# Patient Record
Sex: MALE | Race: WHITE | NOT HISPANIC OR LATINO | Employment: FULL TIME | URBAN - METROPOLITAN AREA
[De-identification: names, ages, dates, MRNs, and addresses within clinical notes are randomized per-mention and may not be internally consistent; named-entity substitution may affect disease eponyms.]

---

## 2023-05-19 PROBLEM — K21.9 CHRONIC GERD: Status: ACTIVE | Noted: 2023-05-19

## 2023-05-19 PROBLEM — Q43.3 MALROTATION OF INTESTINE (MULTI): Status: ACTIVE | Noted: 2023-05-19

## 2023-05-19 PROBLEM — H91.93 HEARING LOSS, BILATERAL: Status: ACTIVE | Noted: 2023-05-19

## 2023-05-19 PROBLEM — I86.1 VARICOCELE: Status: ACTIVE | Noted: 2023-05-19

## 2023-05-19 PROBLEM — Z86.79 PERSONAL HISTORY OF ATRIAL FIBRILLATION: Status: ACTIVE | Noted: 2023-05-19

## 2023-05-19 PROBLEM — I83.90 VARICOSITIES OF LEG: Status: ACTIVE | Noted: 2023-05-19

## 2023-05-19 PROBLEM — R17 ELEVATED BILIRUBIN: Status: ACTIVE | Noted: 2023-05-19

## 2023-05-29 ASSESSMENT — PROMIS GLOBAL HEALTH SCALE
RATE_PHYSICAL_HEALTH: GOOD
RATE_AVERAGE_FATIGUE: MODERATE
CARRYOUT_PHYSICAL_ACTIVITIES: COMPLETELY
RATE_QUALITY_OF_LIFE: VERY GOOD
RATE_MENTAL_HEALTH: GOOD
EMOTIONAL_PROBLEMS: RARELY
RATE_SOCIAL_SATISFACTION: GOOD
RATE_AVERAGE_PAIN: 3
CARRYOUT_SOCIAL_ACTIVITIES: GOOD
RATE_GENERAL_HEALTH: GOOD

## 2023-05-30 ENCOUNTER — OFFICE VISIT (OUTPATIENT)
Dept: PRIMARY CARE | Facility: CLINIC | Age: 58
End: 2023-05-30
Payer: COMMERCIAL

## 2023-05-30 VITALS
SYSTOLIC BLOOD PRESSURE: 112 MMHG | HEART RATE: 62 BPM | RESPIRATION RATE: 19 BRPM | HEIGHT: 71 IN | BODY MASS INDEX: 28.14 KG/M2 | DIASTOLIC BLOOD PRESSURE: 60 MMHG | WEIGHT: 201 LBS

## 2023-05-30 DIAGNOSIS — Z00.00 ROUTINE GENERAL MEDICAL EXAMINATION AT A HEALTH CARE FACILITY: Primary | ICD-10-CM

## 2023-05-30 DIAGNOSIS — Z13.29 SCREENING FOR THYROID DISORDER: ICD-10-CM

## 2023-05-30 DIAGNOSIS — Z12.5 SCREENING FOR PROSTATE CANCER: ICD-10-CM

## 2023-05-30 DIAGNOSIS — R17 ELEVATED BILIRUBIN: ICD-10-CM

## 2023-05-30 DIAGNOSIS — K21.9 CHRONIC GERD: ICD-10-CM

## 2023-05-30 DIAGNOSIS — Z86.79 PERSONAL HISTORY OF ATRIAL FIBRILLATION: ICD-10-CM

## 2023-05-30 DIAGNOSIS — Z13.220 SCREENING FOR LIPID DISORDERS: ICD-10-CM

## 2023-05-30 DIAGNOSIS — H90.3 SENSORINEURAL HEARING LOSS (SNHL) OF BOTH EARS: ICD-10-CM

## 2023-05-30 DIAGNOSIS — I83.93 VARICOSE VEINS OF BOTH LOWER EXTREMITIES WITHOUT ULCER OR INFLAMMATION: ICD-10-CM

## 2023-05-30 PROBLEM — I83.90 VARICOSE VEINS WITHOUT COMPLICATION: Status: RESOLVED | Noted: 2023-05-19 | Resolved: 2023-05-30

## 2023-05-30 PROCEDURE — 84153 ASSAY OF PSA TOTAL: CPT

## 2023-05-30 PROCEDURE — 80061 LIPID PANEL: CPT

## 2023-05-30 PROCEDURE — 84443 ASSAY THYROID STIM HORMONE: CPT

## 2023-05-30 PROCEDURE — 85027 COMPLETE CBC AUTOMATED: CPT

## 2023-05-30 PROCEDURE — 80053 COMPREHEN METABOLIC PANEL: CPT

## 2023-05-30 PROCEDURE — 1036F TOBACCO NON-USER: CPT | Performed by: INTERNAL MEDICINE

## 2023-05-30 PROCEDURE — 99396 PREV VISIT EST AGE 40-64: CPT | Performed by: INTERNAL MEDICINE

## 2023-05-30 ASSESSMENT — ENCOUNTER SYMPTOMS
HEADACHES: 0
DYSPHORIC MOOD: 0
WHEEZING: 0
WOUND: 0
LIGHT-HEADEDNESS: 0
POLYDIPSIA: 0
CHEST TIGHTNESS: 0
HALLUCINATIONS: 0
ADENOPATHY: 0
FACIAL SWELLING: 0
BRUISES/BLEEDS EASILY: 0
MYALGIAS: 0
DIZZINESS: 0
SINUS PRESSURE: 0
SPEECH DIFFICULTY: 0
EYE DISCHARGE: 0
NUMBNESS: 0
EYE REDNESS: 0
APPETITE CHANGE: 0
APNEA: 0
NERVOUS/ANXIOUS: 0
DIAPHORESIS: 0
NECK STIFFNESS: 0
JOINT SWELLING: 0
DYSURIA: 0
COLOR CHANGE: 0
BACK PAIN: 0
BLOOD IN STOOL: 0
FACIAL ASYMMETRY: 0
NECK PAIN: 0
PHOTOPHOBIA: 0
ABDOMINAL DISTENTION: 0
FLANK PAIN: 0
POLYPHAGIA: 0
EYE PAIN: 0
UNEXPECTED WEIGHT CHANGE: 0
FATIGUE: 0
ABDOMINAL PAIN: 0
RHINORRHEA: 0
FREQUENCY: 1
HYPERACTIVE: 0
TROUBLE SWALLOWING: 0
SORE THROAT: 0
SHORTNESS OF BREATH: 0
ARTHRALGIAS: 1
VOICE CHANGE: 0
WEAKNESS: 0
ACTIVITY CHANGE: 0
CHILLS: 0
TREMORS: 0
DIFFICULTY URINATING: 0
AGITATION: 0
CONSTIPATION: 0
VOMITING: 0
SLEEP DISTURBANCE: 0
CONFUSION: 0
ANAL BLEEDING: 0
HEMATURIA: 0
SEIZURES: 0
CHOKING: 0
STRIDOR: 0
DECREASED CONCENTRATION: 0
PALPITATIONS: 0
NAUSEA: 0
FEVER: 0
SINUS PAIN: 0
RECTAL PAIN: 0
COUGH: 0
DIARRHEA: 0
EYE ITCHING: 0

## 2023-05-30 NOTE — PATIENT INSTRUCTIONS
Continue healthy diet and exercise regularly-goal of 150 minutes a week of moderate activity  We did blood work today and will call with results if abnormal or changed from previous year   Look for Flu and yearly covid boosters in the fall  Follow up here in 1 year-sooner if needed

## 2023-05-30 NOTE — PROGRESS NOTES
Subjective   Patient ID: Anibal Hobson is a 57 y.o. male who presents for Annual Exam.    HPI  Pt here for full physical.  He is feeling well.  His diet is good and staying active-2 times a week he exercises.  His weight is stable.      He had 4 Covid vaccines plus had covid.  He normally gets flu shots.  He had the 2 part Shingles vaccine.      He did Cologuard in May of 2022 that was normal.  He has no GI issues.  No family history of colorectal cancer.      Review of Systems   Constitutional:  Negative for activity change, appetite change, chills, diaphoresis, fatigue, fever and unexpected weight change.   HENT:  Positive for hearing loss (has hearing aides). Negative for congestion, dental problem, drooling, ear discharge, ear pain, facial swelling, mouth sores, nosebleeds, postnasal drip, rhinorrhea, sinus pressure, sinus pain, sneezing, sore throat, tinnitus, trouble swallowing and voice change.    Eyes:  Positive for visual disturbance (wears glasses-up to date on exam). Negative for photophobia, pain, discharge, redness and itching.   Respiratory:  Negative for apnea, cough, choking, chest tightness, shortness of breath, wheezing and stridor.    Cardiovascular:  Negative for chest pain, palpitations and leg swelling.   Gastrointestinal:  Negative for abdominal distention, abdominal pain, anal bleeding, blood in stool, constipation, diarrhea, nausea, rectal pain and vomiting.   Endocrine: Negative for cold intolerance, heat intolerance, polydipsia, polyphagia and polyuria.   Genitourinary:  Positive for frequency. Negative for decreased urine volume, difficulty urinating, dysuria, enuresis, flank pain, genital sores, hematuria, penile discharge, penile pain, penile swelling, scrotal swelling, testicular pain and urgency.   Musculoskeletal:  Positive for arthralgias (knee pain). Negative for back pain, gait problem, joint swelling, myalgias, neck pain and neck stiffness.   Skin:  Negative for color change,  "pallor, rash and wound.        Skin lesion on face/back    Allergic/Immunologic: Negative for environmental allergies, food allergies and immunocompromised state.   Neurological:  Negative for dizziness, tremors, seizures, syncope, facial asymmetry, speech difficulty, weakness, light-headedness, numbness and headaches.   Hematological:  Negative for adenopathy. Does not bruise/bleed easily.   Psychiatric/Behavioral:  Negative for agitation, behavioral problems, confusion, decreased concentration, dysphoric mood, hallucinations, self-injury, sleep disturbance and suicidal ideas. The patient is not nervous/anxious and is not hyperactive.        Objective   /60   Pulse 62   Resp 19   Ht 1.803 m (5' 11\")   Wt 91.2 kg (201 lb)   BMI 28.03 kg/m²    Physical Exam  Constitutional:       Appearance: Normal appearance.   HENT:      Head: Normocephalic and atraumatic.      Right Ear: Tympanic membrane, ear canal and external ear normal. There is no impacted cerumen.      Left Ear: Tympanic membrane, ear canal and external ear normal. There is no impacted cerumen.      Nose: Nose normal. No congestion or rhinorrhea.      Mouth/Throat:      Mouth: Mucous membranes are moist.      Pharynx: Oropharynx is clear. No oropharyngeal exudate or posterior oropharyngeal erythema.   Eyes:      Extraocular Movements: Extraocular movements intact.      Conjunctiva/sclera: Conjunctivae normal.      Pupils: Pupils are equal, round, and reactive to light.   Neck:      Vascular: No carotid bruit.   Cardiovascular:      Rate and Rhythm: Normal rate and regular rhythm.      Pulses: Normal pulses.      Heart sounds: Normal heart sounds. No murmur heard.  Pulmonary:      Effort: Pulmonary effort is normal. No respiratory distress.      Breath sounds: Normal breath sounds. No wheezing, rhonchi or rales.   Abdominal:      General: Abdomen is flat. Bowel sounds are normal. There is no distension.      Palpations: Abdomen is soft.      " Tenderness: There is no abdominal tenderness.      Hernia: No hernia is present.   Musculoskeletal:         General: No swelling or tenderness. Normal range of motion.      Cervical back: Normal range of motion and neck supple.      Right lower leg: No edema.      Left lower leg: No edema.   Lymphadenopathy:      Cervical: No cervical adenopathy.   Skin:     General: Skin is warm and dry.      Findings: No lesion or rash.   Neurological:      General: No focal deficit present.      Mental Status: He is alert and oriented to person, place, and time.      Cranial Nerves: No cranial nerve deficit.      Sensory: No sensory deficit.      Motor: No weakness.   Psychiatric:         Mood and Affect: Mood normal.         Behavior: Behavior normal.         Thought Content: Thought content normal.         Judgment: Judgment normal.         Assessment/Plan   Problem List Items Addressed This Visit          Circulatory    Varicose veins of both lower extremities without ulcer or inflammation     Pt wears compression socks when able  No current issues             Digestive    Chronic GERD     Not on any medications  Diet controlled             Other    Elevated bilirubin    Hearing loss, bilateral     Wears hearing aides          Personal history of atrial fibrillation     No recent issues           Other Visit Diagnoses       Routine general medical examination at a health care facility    -  Primary    Relevant Orders    Comprehensive Metabolic Panel    CBC    Follow Up In Primary Care    Screening for lipid disorders        Relevant Orders    Lipid Panel    Screening for thyroid disorder        Relevant Orders    TSH    Screening for prostate cancer        Relevant Orders    Prostate Specific Antigen

## 2023-05-31 LAB
ALANINE AMINOTRANSFERASE (SGPT) (U/L) IN SER/PLAS: 15 U/L (ref 10–52)
ALBUMIN (G/DL) IN SER/PLAS: 4.2 G/DL (ref 3.4–5)
ALKALINE PHOSPHATASE (U/L) IN SER/PLAS: 83 U/L (ref 33–120)
ANION GAP IN SER/PLAS: 12 MMOL/L (ref 10–20)
ASPARTATE AMINOTRANSFERASE (SGOT) (U/L) IN SER/PLAS: 16 U/L (ref 9–39)
BILIRUBIN TOTAL (MG/DL) IN SER/PLAS: 0.8 MG/DL (ref 0–1.2)
CALCIUM (MG/DL) IN SER/PLAS: 9.5 MG/DL (ref 8.6–10.6)
CARBON DIOXIDE, TOTAL (MMOL/L) IN SER/PLAS: 29 MMOL/L (ref 21–32)
CHLORIDE (MMOL/L) IN SER/PLAS: 105 MMOL/L (ref 98–107)
CHOLESTEROL (MG/DL) IN SER/PLAS: 195 MG/DL (ref 0–199)
CHOLESTEROL IN HDL (MG/DL) IN SER/PLAS: 58.8 MG/DL
CHOLESTEROL/HDL RATIO: 3.3
CREATININE (MG/DL) IN SER/PLAS: 0.83 MG/DL (ref 0.5–1.3)
ERYTHROCYTE DISTRIBUTION WIDTH (RATIO) BY AUTOMATED COUNT: 12.3 % (ref 11.5–14.5)
ERYTHROCYTE MEAN CORPUSCULAR HEMOGLOBIN CONCENTRATION (G/DL) BY AUTOMATED: 32.5 G/DL (ref 32–36)
ERYTHROCYTE MEAN CORPUSCULAR VOLUME (FL) BY AUTOMATED COUNT: 85 FL (ref 80–100)
ERYTHROCYTES (10*6/UL) IN BLOOD BY AUTOMATED COUNT: 5.51 X10E12/L (ref 4.5–5.9)
GFR MALE: >90 ML/MIN/1.73M2
GLUCOSE (MG/DL) IN SER/PLAS: 108 MG/DL (ref 74–99)
HEMATOCRIT (%) IN BLOOD BY AUTOMATED COUNT: 46.8 % (ref 41–52)
HEMOGLOBIN (G/DL) IN BLOOD: 15.2 G/DL (ref 13.5–17.5)
LDL: 127 MG/DL (ref 0–99)
LEUKOCYTES (10*3/UL) IN BLOOD BY AUTOMATED COUNT: 5.1 X10E9/L (ref 4.4–11.3)
NRBC (PER 100 WBCS) BY AUTOMATED COUNT: 0 /100 WBC (ref 0–0)
PLATELETS (10*3/UL) IN BLOOD AUTOMATED COUNT: 234 X10E9/L (ref 150–450)
POTASSIUM (MMOL/L) IN SER/PLAS: 4.4 MMOL/L (ref 3.5–5.3)
PROSTATE SPECIFIC AG (NG/ML) IN SER/PLAS: 1.05 NG/ML (ref 0–4)
PROTEIN TOTAL: 7 G/DL (ref 6.4–8.2)
SODIUM (MMOL/L) IN SER/PLAS: 142 MMOL/L (ref 136–145)
THYROTROPIN (MIU/L) IN SER/PLAS BY DETECTION LIMIT <= 0.05 MIU/L: 1.35 MIU/L (ref 0.44–3.98)
TRIGLYCERIDE (MG/DL) IN SER/PLAS: 47 MG/DL (ref 0–149)
UREA NITROGEN (MG/DL) IN SER/PLAS: 23 MG/DL (ref 6–23)
VLDL: 9 MG/DL (ref 0–40)

## 2023-12-14 ENCOUNTER — OFFICE VISIT (OUTPATIENT)
Dept: PRIMARY CARE | Facility: CLINIC | Age: 58
End: 2023-12-14
Payer: COMMERCIAL

## 2023-12-14 ENCOUNTER — ANCILLARY PROCEDURE (OUTPATIENT)
Dept: RADIOLOGY | Facility: CLINIC | Age: 58
End: 2023-12-14
Payer: COMMERCIAL

## 2023-12-14 VITALS — SYSTOLIC BLOOD PRESSURE: 116 MMHG | HEART RATE: 76 BPM | DIASTOLIC BLOOD PRESSURE: 76 MMHG

## 2023-12-14 DIAGNOSIS — M25.561 CHRONIC PAIN OF RIGHT KNEE: ICD-10-CM

## 2023-12-14 DIAGNOSIS — G89.29 CHRONIC PAIN OF RIGHT KNEE: ICD-10-CM

## 2023-12-14 DIAGNOSIS — M25.561 CHRONIC PAIN OF RIGHT KNEE: Primary | ICD-10-CM

## 2023-12-14 DIAGNOSIS — G89.29 CHRONIC PAIN OF RIGHT KNEE: Primary | ICD-10-CM

## 2023-12-14 PROCEDURE — 73564 X-RAY EXAM KNEE 4 OR MORE: CPT | Mod: RT,FY

## 2023-12-14 PROCEDURE — 1036F TOBACCO NON-USER: CPT | Performed by: INTERNAL MEDICINE

## 2023-12-14 PROCEDURE — 73564 X-RAY EXAM KNEE 4 OR MORE: CPT | Mod: RIGHT SIDE | Performed by: RADIOLOGY

## 2023-12-14 PROCEDURE — 99213 OFFICE O/P EST LOW 20 MIN: CPT | Performed by: INTERNAL MEDICINE

## 2023-12-14 RX ORDER — MELOXICAM 7.5 MG/1
7.5-15 TABLET ORAL DAILY
Qty: 30 TABLET | Refills: 0 | Status: SHIPPED | OUTPATIENT
Start: 2023-12-14 | End: 2023-12-29

## 2023-12-14 ASSESSMENT — ENCOUNTER SYMPTOMS
JOINT SWELLING: 0
ARTHRALGIAS: 1

## 2023-12-14 NOTE — PROGRESS NOTES
Subjective   Patient ID: Anibal Hobson is a 58 y.o. male who presents for Knee Pain (Right knee pain for at least 6 months).    HPI    Pt here for acute visit.  He is having right knee pain.  He tells me when sitting for long periods of time and he goes to get up he will note sharp pain and it is very hard to get moving.  He will limp for 15 minutes.  He does have history of injury to this knee with a foreign metal particle in the knee from an explosive work injury many years ago.  No swelling/redness of the knee.  He hasn't taken any medications for this.  He feels the pain lessens as he walks more but his wife feels he limps continuously.      Review of Systems   Musculoskeletal:  Positive for arthralgias. Negative for joint swelling.       Objective   /76 (BP Location: Right arm, Patient Position: Sitting)   Pulse 76    Physical Exam  Constitutional:       Appearance: Normal appearance.   Musculoskeletal:         General: Tenderness present. No swelling, deformity or signs of injury. Normal range of motion.      Right lower leg: No edema.      Left lower leg: No edema.      Comments: Tenderness noted medial aspect of right knee with lateral isolation     Neurological:      Mental Status: He is alert and oriented to person, place, and time.         Assessment/Plan   Problem List Items Addressed This Visit       Chronic pain of right knee - Primary     Xray today  Nsaid use for pain  Referral to ortho  May need PT prior to MRI-concern for medial mensical tear/injury          Relevant Medications    meloxicam (Mobic) 7.5 mg tablet    Other Relevant Orders    XR knee right 4+ views    Referral to Orthopaedic Surgery

## 2023-12-14 NOTE — ASSESSMENT & PLAN NOTE
Xray today  Nsaid use for pain  Referral to ortho  May need PT prior to MRI-concern for medial mensical tear/injury

## 2023-12-14 NOTE — PATIENT INSTRUCTIONS
Get xray of knee today and we will call with results  Start Meloxicam 1-2 tablets as needed with food (recommend taking at the minimum 1 a day)   We did put in ortho referral to call and schedule appointment if knee pain persists  May need physical therapy and possible MRI if not getting better  Follow up based on results

## 2023-12-28 ENCOUNTER — OFFICE VISIT (OUTPATIENT)
Dept: ORTHOPEDIC SURGERY | Facility: CLINIC | Age: 58
End: 2023-12-28
Payer: COMMERCIAL

## 2023-12-28 DIAGNOSIS — M25.561 CHRONIC PAIN OF RIGHT KNEE: ICD-10-CM

## 2023-12-28 DIAGNOSIS — S83.241A ACUTE MEDIAL MENISCUS TEAR OF RIGHT KNEE, INITIAL ENCOUNTER: Primary | ICD-10-CM

## 2023-12-28 DIAGNOSIS — G89.29 CHRONIC PAIN OF RIGHT KNEE: ICD-10-CM

## 2023-12-28 PROCEDURE — 20610 DRAIN/INJ JOINT/BURSA W/O US: CPT | Performed by: ORTHOPAEDIC SURGERY

## 2023-12-28 PROCEDURE — 99203 OFFICE O/P NEW LOW 30 MIN: CPT | Performed by: ORTHOPAEDIC SURGERY

## 2023-12-28 PROCEDURE — 1036F TOBACCO NON-USER: CPT | Performed by: ORTHOPAEDIC SURGERY

## 2023-12-28 RX ORDER — TRIAMCINOLONE ACETONIDE 40 MG/ML
40 INJECTION, SUSPENSION INTRA-ARTICULAR; INTRAMUSCULAR
Status: COMPLETED | OUTPATIENT
Start: 2023-12-28 | End: 2023-12-28

## 2023-12-28 RX ORDER — LIDOCAINE HYDROCHLORIDE 20 MG/ML
2 INJECTION, SOLUTION INFILTRATION; PERINEURAL
Status: COMPLETED | OUTPATIENT
Start: 2023-12-28 | End: 2023-12-28

## 2023-12-28 RX ADMIN — LIDOCAINE HYDROCHLORIDE 2 ML: 20 INJECTION, SOLUTION INFILTRATION; PERINEURAL at 09:14

## 2023-12-28 RX ADMIN — TRIAMCINOLONE ACETONIDE 40 MG: 40 INJECTION, SUSPENSION INTRA-ARTICULAR; INTRAMUSCULAR at 09:14

## 2023-12-28 ASSESSMENT — ENCOUNTER SYMPTOMS: KNEE SWELLING: 1

## 2023-12-28 NOTE — PROGRESS NOTES
Subjective    Patient ID: Anibal Hobson is a 58 y.o. male.    Chief Complaint: Pain of the Right Knee     Last Surgery: No surgery found  Last Surgery Date: No surgery found    Right Knee       Is a 58-year-old gentleman who comes in with at least a 6-month history of right knee pain.  He does not recall any specific trauma.  The pain has been medial in nature.  He has undergone physical therapy which did not provide much relief.  However he then took meloxicam which is providing more relief.  He denies fevers or chills.  The patient did have an injury to his right knee approximately 15 years ago where a small piece of metal was lodged along the medial aspect of his knee but not in the joint.    Objective   Ortho Exam  Patient is in no acute distress.  He walks with no evidence of an antalgic gait.  Exam of his right knee reveals he has a mild effusion.  He has an intact skin envelope.  He is tender over the medial joint line.  He has no tenderness over the lateral joint line.  Knee is stable to varus and valgus stress testing.  He has a negative Lachman's test.  He does have a positive Hany's test.  Active range of motion is 0 to 120 degrees of flexion.    Image Results:  XR knee right 4+ views  Narrative: Interpreted By:  Kevin Molina,   STUDY:  XR KNEE RIGHT 4+ VIEWS      INDICATION:  Signs/Symptoms:knee pain.      COMPARISON:  None      ACCESSION NUMBER(S):  [id number]      ORDERING CLINICIAN:  MATT BETH      FINDINGS:  Minimal osteoarthritis.      Tiny 2-3 mm metallic density over the medial soft tissues could be  foreign body of uncertain acuity.      No evidence of acute fracture.      Impression: Minimal osteoarthritis.      Tiny 2-3 mm metallic density over the medial soft tissues could be  foreign body of uncertain acuity.      Signed by: Kevin Molina 12/15/2023 5:38 PM  Dictation workstation:   [id number]      Assessment/Plan   Encounter Diagnoses:  Acute medial meniscus tear of right knee,  initial encounter    Chronic pain of right knee    Patient has right knee pain is likely secondary to a degenerative medial meniscus tear.  We discussed conservative treatment versus workup options.  At this point he elected undergo a Kenalog injection today in the office.    L Inj/Asp: R knee on 12/28/2023 9:14 AM  Indications: pain  Details: 22 G needle, anteromedial approach  Medications: 40 mg triamcinolone acetonide 40 mg/mL; 2 mL lidocaine 20 mg/mL (2 %)  Outcome: tolerated well, no immediate complications  Procedure, treatment alternatives, risks and benefits explained, specific risks discussed. Consent was given by the patient. Immediately prior to procedure a time out was called to verify the correct patient, procedure, equipment, support staff and site/side marked as required. Patient was prepped and draped in the usual sterile fashion.         Patient was informed we will take about a week for the injection have an effect.  If the injection does not provide enough relief within the next 4 to 6 weeks she should call back and an MRI will be considered.

## 2024-03-22 ENCOUNTER — OFFICE VISIT (OUTPATIENT)
Dept: PRIMARY CARE | Facility: CLINIC | Age: 59
End: 2024-03-22
Payer: COMMERCIAL

## 2024-03-22 ENCOUNTER — HOSPITAL ENCOUNTER (OUTPATIENT)
Dept: RADIOLOGY | Facility: CLINIC | Age: 59
Discharge: HOME | End: 2024-03-22
Payer: COMMERCIAL

## 2024-03-22 VITALS — HEART RATE: 60 BPM | SYSTOLIC BLOOD PRESSURE: 118 MMHG | DIASTOLIC BLOOD PRESSURE: 70 MMHG

## 2024-03-22 DIAGNOSIS — R07.81 RIB PAIN ON LEFT SIDE: ICD-10-CM

## 2024-03-22 DIAGNOSIS — R07.81 RIB PAIN ON LEFT SIDE: Primary | ICD-10-CM

## 2024-03-22 PROCEDURE — 99213 OFFICE O/P EST LOW 20 MIN: CPT | Performed by: INTERNAL MEDICINE

## 2024-03-22 PROCEDURE — 71101 X-RAY EXAM UNILAT RIBS/CHEST: CPT | Mod: LT

## 2024-03-22 PROCEDURE — 71101 X-RAY EXAM UNILAT RIBS/CHEST: CPT | Mod: LEFT SIDE | Performed by: RADIOLOGY

## 2024-03-22 PROCEDURE — 1036F TOBACCO NON-USER: CPT | Performed by: INTERNAL MEDICINE

## 2024-03-22 ASSESSMENT — ENCOUNTER SYMPTOMS
NUMBNESS: 0
ARTHRALGIAS: 1
PAIN: 1

## 2024-03-22 NOTE — PROGRESS NOTES
Subjective   Patient ID: Anibal Hobson is a 58 y.o. male who presents for Pain (Left sided rib pain ).    Pain      Pt here in acute visit.  He recalls months ago sneezing and immediately have sharp pain anterior chest wall on the left.  He has been using advil to help manage discomfort and at times it is better/goes away but continues to come on at other times.  Yesterday he stretched and the pain returned.  He did take a hot water bath that did help alleviate the pain.      Review of Systems   Musculoskeletal:  Positive for arthralgias (left chest pain/rib).   Neurological:  Negative for numbness.       Objective   /70 (BP Location: Left arm, Patient Position: Sitting)   Pulse 60    Physical Exam  Musculoskeletal:         General: Tenderness (pinpoint tenderness left anterior lower ribs) present. No swelling, deformity or signs of injury. Normal range of motion.      Cervical back: Normal range of motion.         Assessment/Plan   Problem List Items Addressed This Visit       Rib pain on left side - Primary     Xray of ribs ordered  Continue tylenol/ibuprofen use for pain relief  Moist heat to area

## 2024-03-22 NOTE — PATIENT INSTRUCTIONS
Schedule and use the combo Tylenol/ibuprofen product  Can use moist heat to area as it is helpful 15 minutes/time  Get xray done and we will assess for any fracture/rib pathology   Follow up based on results

## 2024-06-03 ENCOUNTER — OFFICE VISIT (OUTPATIENT)
Dept: PRIMARY CARE | Facility: CLINIC | Age: 59
End: 2024-06-03
Payer: COMMERCIAL

## 2024-06-03 VITALS
HEART RATE: 56 BPM | HEIGHT: 71 IN | BODY MASS INDEX: 28.53 KG/M2 | RESPIRATION RATE: 16 BRPM | DIASTOLIC BLOOD PRESSURE: 68 MMHG | WEIGHT: 203.8 LBS | SYSTOLIC BLOOD PRESSURE: 118 MMHG

## 2024-06-03 DIAGNOSIS — Z13.220 SCREENING FOR LIPID DISORDERS: ICD-10-CM

## 2024-06-03 DIAGNOSIS — G89.29 CHRONIC PAIN OF RIGHT KNEE: ICD-10-CM

## 2024-06-03 DIAGNOSIS — M25.561 CHRONIC PAIN OF RIGHT KNEE: ICD-10-CM

## 2024-06-03 DIAGNOSIS — K21.9 CHRONIC GERD: ICD-10-CM

## 2024-06-03 DIAGNOSIS — L98.9 SKIN LESION OF FACE: ICD-10-CM

## 2024-06-03 DIAGNOSIS — Z12.5 SCREENING FOR PROSTATE CANCER: ICD-10-CM

## 2024-06-03 DIAGNOSIS — H90.0 CONDUCTIVE HEARING LOSS, BILATERAL: ICD-10-CM

## 2024-06-03 DIAGNOSIS — Z13.1 SCREENING FOR DIABETES MELLITUS: ICD-10-CM

## 2024-06-03 DIAGNOSIS — I48.0 PAROXYSMAL ATRIAL FIBRILLATION (MULTI): ICD-10-CM

## 2024-06-03 DIAGNOSIS — Z13.29 SCREENING FOR THYROID DISORDER: ICD-10-CM

## 2024-06-03 DIAGNOSIS — Z00.00 ROUTINE GENERAL MEDICAL EXAMINATION AT A HEALTH CARE FACILITY: Primary | ICD-10-CM

## 2024-06-03 DIAGNOSIS — I83.93 VARICOSE VEINS OF BOTH LOWER EXTREMITIES WITHOUT ULCER OR INFLAMMATION: ICD-10-CM

## 2024-06-03 PROCEDURE — 84153 ASSAY OF PSA TOTAL: CPT

## 2024-06-03 PROCEDURE — 80061 LIPID PANEL: CPT

## 2024-06-03 PROCEDURE — 85027 COMPLETE CBC AUTOMATED: CPT

## 2024-06-03 PROCEDURE — 1036F TOBACCO NON-USER: CPT | Performed by: INTERNAL MEDICINE

## 2024-06-03 PROCEDURE — 99396 PREV VISIT EST AGE 40-64: CPT | Performed by: INTERNAL MEDICINE

## 2024-06-03 PROCEDURE — 84443 ASSAY THYROID STIM HORMONE: CPT

## 2024-06-03 PROCEDURE — 36415 COLL VENOUS BLD VENIPUNCTURE: CPT

## 2024-06-03 PROCEDURE — 80053 COMPREHEN METABOLIC PANEL: CPT

## 2024-06-03 PROCEDURE — 81003 URINALYSIS AUTO W/O SCOPE: CPT

## 2024-06-03 PROCEDURE — 83036 HEMOGLOBIN GLYCOSYLATED A1C: CPT

## 2024-06-03 ASSESSMENT — ENCOUNTER SYMPTOMS
POLYPHAGIA: 0
COLOR CHANGE: 0
DIAPHORESIS: 0
NECK PAIN: 0
FREQUENCY: 0
SINUS PAIN: 0
EYE REDNESS: 0
SINUS PRESSURE: 0
DIZZINESS: 0
HYPERACTIVE: 0
NECK STIFFNESS: 0
UNEXPECTED WEIGHT CHANGE: 0
EYE DISCHARGE: 0
CONSTIPATION: 0
ACTIVITY CHANGE: 0
DIARRHEA: 0
TREMORS: 0
VOMITING: 0
APNEA: 0
CHEST TIGHTNESS: 0
RHINORRHEA: 0
ARTHRALGIAS: 1
FACIAL ASYMMETRY: 0
CHILLS: 0
CONFUSION: 0
FLANK PAIN: 0
BLOOD IN STOOL: 0
JOINT SWELLING: 0
DIFFICULTY URINATING: 0
BRUISES/BLEEDS EASILY: 0
NUMBNESS: 0
SORE THROAT: 0
NAUSEA: 0
DECREASED CONCENTRATION: 0
FEVER: 0
STRIDOR: 0
SPEECH DIFFICULTY: 0
PHOTOPHOBIA: 0
ABDOMINAL DISTENTION: 0
HEMATURIA: 0
BACK PAIN: 0
COUGH: 0
DYSURIA: 0
AGITATION: 0
WHEEZING: 0
ADENOPATHY: 0
TROUBLE SWALLOWING: 0
VOICE CHANGE: 0
ABDOMINAL PAIN: 0
EYE PAIN: 0
LIGHT-HEADEDNESS: 0
WEAKNESS: 0
RECTAL PAIN: 0
SHORTNESS OF BREATH: 0
PALPITATIONS: 0
WOUND: 0
DYSPHORIC MOOD: 0
SLEEP DISTURBANCE: 1
HEADACHES: 0
FATIGUE: 0
HALLUCINATIONS: 0
APPETITE CHANGE: 0
SEIZURES: 0
ANAL BLEEDING: 0
EYE ITCHING: 0
MYALGIAS: 0
FACIAL SWELLING: 0
POLYDIPSIA: 0
NERVOUS/ANXIOUS: 0
CHOKING: 0

## 2024-06-03 ASSESSMENT — PATIENT HEALTH QUESTIONNAIRE - PHQ9
SUM OF ALL RESPONSES TO PHQ9 QUESTIONS 1 AND 2: 0
2. FEELING DOWN, DEPRESSED OR HOPELESS: NOT AT ALL
1. LITTLE INTEREST OR PLEASURE IN DOING THINGS: NOT AT ALL

## 2024-06-03 NOTE — ASSESSMENT & PLAN NOTE
No recent issues   He was on meds which put him back into NSR  Not on medication at this time  Not seeing cardio

## 2024-06-03 NOTE — PROGRESS NOTES
Subjective   Patient ID: Anibal Hobson is a 58 y.o. male who presents for Annual Exam.    HPI  Pt here for full physical.  His weight is stable.  He is doing some home PT which is making him active.      He continues to have periodic pain of right knee.  The PT has helped this.  He also had cortisol injection which helped. He is having some left shoulder pain and hands/fingers.      He did cologuard in 2022 and was negative.  No GI or bowel issues. He does have some heartburn but treats with diet modifications.      His sleep is disrupted by getting up overnight to urinate.      Review of Systems   Constitutional:  Negative for activity change, appetite change, chills, diaphoresis, fatigue, fever and unexpected weight change.   HENT:  Negative for congestion, dental problem, drooling, ear discharge, ear pain, facial swelling, hearing loss, mouth sores, nosebleeds, postnasal drip, rhinorrhea, sinus pressure, sinus pain, sneezing, sore throat, tinnitus, trouble swallowing and voice change.    Eyes:  Negative for photophobia, pain, discharge, redness, itching and visual disturbance (wears glasses-had recent exam).   Respiratory:  Negative for apnea, cough, choking, chest tightness, shortness of breath, wheezing and stridor.    Cardiovascular:  Negative for chest pain, palpitations and leg swelling.   Gastrointestinal:  Negative for abdominal distention, abdominal pain, anal bleeding, blood in stool, constipation, diarrhea, nausea, rectal pain and vomiting.   Endocrine: Negative for cold intolerance, heat intolerance, polydipsia, polyphagia and polyuria.   Genitourinary:  Negative for decreased urine volume, difficulty urinating, dysuria, enuresis, flank pain, frequency, genital sores, hematuria, penile discharge, penile pain, penile swelling, scrotal swelling, testicular pain and urgency.        Nocturia-1 time a night    Musculoskeletal:  Positive for arthralgias. Negative for back pain, gait problem, joint  "swelling, myalgias, neck pain and neck stiffness.   Skin:  Negative for color change, pallor, rash and wound.   Allergic/Immunologic: Negative for environmental allergies, food allergies and immunocompromised state.   Neurological:  Negative for dizziness, tremors, seizures, syncope, facial asymmetry, speech difficulty, weakness, light-headedness, numbness and headaches.   Hematological:  Negative for adenopathy. Does not bruise/bleed easily.   Psychiatric/Behavioral:  Positive for sleep disturbance. Negative for agitation, behavioral problems, confusion, decreased concentration, dysphoric mood, hallucinations, self-injury and suicidal ideas. The patient is not nervous/anxious and is not hyperactive.        Objective   /68 (BP Location: Left arm, Patient Position: Sitting)   Pulse 56   Resp 16   Ht 1.803 m (5' 11\")   Wt 92.4 kg (203 lb 12.8 oz)   BMI 28.42 kg/m²    Physical Exam  Constitutional:       Appearance: Normal appearance.   HENT:      Head: Normocephalic and atraumatic.      Right Ear: Tympanic membrane, ear canal and external ear normal. There is no impacted cerumen.      Left Ear: Tympanic membrane, ear canal and external ear normal. There is no impacted cerumen.      Ears:      Comments: +hearing aids bilaterally      Nose: Nose normal. No congestion or rhinorrhea.      Mouth/Throat:      Mouth: Mucous membranes are moist.      Pharynx: Oropharynx is clear. No oropharyngeal exudate or posterior oropharyngeal erythema.   Eyes:      Extraocular Movements: Extraocular movements intact.      Conjunctiva/sclera: Conjunctivae normal.      Pupils: Pupils are equal, round, and reactive to light.   Neck:      Vascular: No carotid bruit.   Cardiovascular:      Rate and Rhythm: Normal rate and regular rhythm.      Pulses: Normal pulses.      Heart sounds: Normal heart sounds. No murmur heard.  Pulmonary:      Effort: Pulmonary effort is normal. No respiratory distress.      Breath sounds: Normal breath " sounds. No wheezing, rhonchi or rales.   Abdominal:      General: Abdomen is flat. Bowel sounds are normal. There is no distension.      Palpations: Abdomen is soft.      Tenderness: There is no abdominal tenderness.      Hernia: No hernia is present.   Musculoskeletal:         General: No swelling or tenderness. Normal range of motion.      Cervical back: Normal range of motion and neck supple.      Right lower leg: No edema.      Left lower leg: No edema.   Lymphadenopathy:      Cervical: No cervical adenopathy.   Skin:     General: Skin is warm and dry.      Findings: Lesion (slightly raised small lesion noted left of nose flesh color with small specks of pigment noted) present. No rash.      Comments: Pt also has numerous skin lesions on back-few of which appear to be seborrheic keratosis    Neurological:      General: No focal deficit present.      Mental Status: He is alert and oriented to person, place, and time.      Cranial Nerves: No cranial nerve deficit.      Sensory: No sensory deficit.      Motor: No weakness.   Psychiatric:         Mood and Affect: Mood normal.         Behavior: Behavior normal.         Thought Content: Thought content normal.         Judgment: Judgment normal.         Assessment/Plan   Problem List Items Addressed This Visit       Chronic GERD     Dietary modifications          Hearing loss, bilateral     Wears hearing aides          Varicose veins of both lower extremities without ulcer or inflammation     Wears compression socks which help with symptoms          Chronic pain of right knee     Doing home PT which has helped  He had a cortisol injection in past which also provided relief          Paroxysmal atrial fibrillation (Multi)     No recent issues   He was on meds which put him back into NSR  Not on medication at this time  Not seeing cardio             Other Visit Diagnoses       Routine general medical examination at a health care facility    -  Primary    Relevant Orders     Comprehensive Metabolic Panel    CBC    Urinalysis with Reflex Microscopic    Follow Up In Primary Care - Health Maintenance    Screening for lipid disorders        Relevant Orders    Lipid Panel    Screening for thyroid disorder        Relevant Orders    TSH with reflex to Free T4 if abnormal    Screening for prostate cancer        Relevant Orders    Prostate Specific Antigen    Screening for diabetes mellitus        Relevant Orders    Hemoglobin A1C    Skin lesion of face        Relevant Orders    Referral to Dermatology

## 2024-06-03 NOTE — PATIENT INSTRUCTIONS
Continue to work on healthy diet and regular exercise-start to walk or do some formal exercise with goal of 150 minutes/week for cardiovascular protection  We did blood work today and will call with results if abnormal   Call and schedule to see dermatology ([facility] are in [facility]) for skin lesion on nose (also show them back but those look benign)  Consider and do recommend flu shot come fall  Monitor your left shoulder discomfort-rest, ice, use ibuprofen as needed and if not getting better let me know for xray/PT   Continue home PT for left knee  Follow up here in 1 year-sooner if needed

## 2024-06-04 LAB
ALBUMIN SERPL BCP-MCNC: 4.3 G/DL (ref 3.4–5)
ALP SERPL-CCNC: 87 U/L (ref 33–120)
ALT SERPL W P-5'-P-CCNC: 20 U/L (ref 10–52)
ANION GAP SERPL CALC-SCNC: 12 MMOL/L (ref 10–20)
APPEARANCE UR: CLEAR
AST SERPL W P-5'-P-CCNC: 19 U/L (ref 9–39)
BILIRUB SERPL-MCNC: 0.9 MG/DL (ref 0–1.2)
BILIRUB UR STRIP.AUTO-MCNC: NEGATIVE MG/DL
BUN SERPL-MCNC: 21 MG/DL (ref 6–23)
CALCIUM SERPL-MCNC: 9.2 MG/DL (ref 8.6–10.6)
CHLORIDE SERPL-SCNC: 103 MMOL/L (ref 98–107)
CHOLEST SERPL-MCNC: 188 MG/DL (ref 0–199)
CHOLESTEROL/HDL RATIO: 2.8
CO2 SERPL-SCNC: 28 MMOL/L (ref 21–32)
COLOR UR: COLORLESS
CREAT SERPL-MCNC: 0.85 MG/DL (ref 0.5–1.3)
EGFRCR SERPLBLD CKD-EPI 2021: >90 ML/MIN/1.73M*2
ERYTHROCYTE [DISTWIDTH] IN BLOOD BY AUTOMATED COUNT: 12.3 % (ref 11.5–14.5)
EST. AVERAGE GLUCOSE BLD GHB EST-MCNC: 108 MG/DL
GLUCOSE SERPL-MCNC: 100 MG/DL (ref 74–99)
GLUCOSE UR STRIP.AUTO-MCNC: NORMAL MG/DL
HBA1C MFR BLD: 5.4 %
HCT VFR BLD AUTO: 45.8 % (ref 41–52)
HDLC SERPL-MCNC: 68 MG/DL
HGB BLD-MCNC: 15.1 G/DL (ref 13.5–17.5)
KETONES UR STRIP.AUTO-MCNC: NEGATIVE MG/DL
LDLC SERPL CALC-MCNC: 108 MG/DL
LEUKOCYTE ESTERASE UR QL STRIP.AUTO: NEGATIVE
MCH RBC QN AUTO: 27.9 PG (ref 26–34)
MCHC RBC AUTO-ENTMCNC: 33 G/DL (ref 32–36)
MCV RBC AUTO: 85 FL (ref 80–100)
NITRITE UR QL STRIP.AUTO: NEGATIVE
NON HDL CHOLESTEROL: 120 MG/DL (ref 0–149)
NRBC BLD-RTO: 0 /100 WBCS (ref 0–0)
PH UR STRIP.AUTO: 5.5 [PH]
PLATELET # BLD AUTO: 239 X10*3/UL (ref 150–450)
POTASSIUM SERPL-SCNC: 4.2 MMOL/L (ref 3.5–5.3)
PROT SERPL-MCNC: 7 G/DL (ref 6.4–8.2)
PROT UR STRIP.AUTO-MCNC: NEGATIVE MG/DL
PSA SERPL-MCNC: 0.75 NG/ML
RBC # BLD AUTO: 5.41 X10*6/UL (ref 4.5–5.9)
RBC # UR STRIP.AUTO: NEGATIVE /UL
SODIUM SERPL-SCNC: 139 MMOL/L (ref 136–145)
SP GR UR STRIP.AUTO: 1.01
TRIGL SERPL-MCNC: 60 MG/DL (ref 0–149)
TSH SERPL-ACNC: 1.18 MIU/L (ref 0.44–3.98)
UROBILINOGEN UR STRIP.AUTO-MCNC: NORMAL MG/DL
VLDL: 12 MG/DL (ref 0–40)
WBC # BLD AUTO: 5.5 X10*3/UL (ref 4.4–11.3)

## 2024-07-15 ENCOUNTER — TELEPHONE (OUTPATIENT)
Dept: PRIMARY CARE | Facility: CLINIC | Age: 59
End: 2024-07-15
Payer: COMMERCIAL

## 2024-07-15 DIAGNOSIS — M25.512 LEFT SHOULDER PAIN, UNSPECIFIED CHRONICITY: Primary | ICD-10-CM

## 2024-07-15 NOTE — TELEPHONE ENCOUNTER
Anibal called stating still having left shoulder pain. He mentioned it at last OV  in June and he didn't want to do anything about it at that time, he has since changed his mind and would like to do imaging or Ortho referral.      Please advise

## 2024-07-19 ENCOUNTER — OFFICE VISIT (OUTPATIENT)
Dept: ORTHOPEDIC SURGERY | Facility: CLINIC | Age: 59
End: 2024-07-19
Payer: COMMERCIAL

## 2024-07-19 DIAGNOSIS — M75.41 ROTATOR CUFF IMPINGEMENT SYNDROME OF RIGHT SHOULDER: Primary | ICD-10-CM

## 2024-07-19 PROCEDURE — 1036F TOBACCO NON-USER: CPT | Performed by: FAMILY MEDICINE

## 2024-07-19 PROCEDURE — 99203 OFFICE O/P NEW LOW 30 MIN: CPT | Performed by: FAMILY MEDICINE

## 2024-07-19 NOTE — PROGRESS NOTES
History of Present Illness   Chief Complaint   Patient presents with    OTHER     LT SHOULDER PAIN FOR A FEW MONTHS  [facility]       The patient is 58 y.o. RHD male  here with a complaint of  left shoulder pain. Atraumatic onset ~2 months ago. Waxing and waning in severity. Points to anterior lateral shoulder as area of pain. Sometimes pain sleeping at night, certain overhead movements cause pain but inconsistently similarly with lifting things, no stiffness or weakness. No specific treatments. Did reach out to PCP today who placed xray order and recommended ortho follow up, has xrays on disc today from imaging facility     Past Medical History:   Diagnosis Date    Arthritis 2016    Gastro-esophageal reflux disease without esophagitis 05/17/2022    Chronic GERD    Pain in left shoulder 04/02/2018    Left shoulder pain    Personal history of other diseases of the digestive system 12/06/2018    History of left inguinal hernia    Personal history of other diseases of the digestive system     History of hemorrhoids    Personal history of other diseases of the digestive system     History of gastroesophageal reflux (GERD)    Plantar 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   Tobacco Use    Smoking status: Former     Current packs/day:  1.00     Average packs/day: 1 pack/day for 10.0 years (10.0 ttl pk-yrs)     Types: Cigarettes    Smokeless tobacco: Never   Vaping Use    Vaping status: Never Used   Substance and Sexual Activity    Alcohol use: Not Currently    Drug use: Never    Sexual activity: Yes     Partners: Female   Other Topics Concern    Not on file   Social History Narrative    Not on file     Social Determinants of Health     Financial Resource Strain: Not on file   Food Insecurity: Not on file   Transportation Needs: Not on file   Physical Activity: Not on file   Stress: Not on file   Social Connections: Not on file   Intimate Partner Violence: Not on file   Housing Stability: Not on file       Past Surgical History:   Procedure Laterality Date    HERNIA REPAIR Left 2018    inguinal    OTHER SURGICAL HISTORY  05/21/2020    Colonoscopy    VASECTOMY  04/21/2016    Surgery Vas Deferens Vasectomy          Review of Systems   GENERAL: Negative  GI: Negative  MUSCULOSKELETAL: See HPI  SKIN: Negative  NEURO:  Negative     Physical Exam:    General/Constitutional: well appearing, no distress, appears stated age  HEENT: sclera clear  Respiratory: non labored breathing  Vascular: No edema, swelling or tenderness, except as noted in detailed exam.  Integumentary: No impressive skin lesions present, except as noted in detailed exam.  Neurological:  Alert and oriented   Psychological:  Normal mood and affect.  Musculoskeletal: Normal, except as noted in detailed exam and in HPI    Left shoulder: Normal appearance. No areas of tenderness to palpation. Full active ROM equal to right without pain. No significant pain or weakness with RTC strength testing. Mild pain with landeros test, +[place] test. No shoulder instability        Imaging: xrays of left shoulder from today reviewed on disc. There is some DJD of AC joint otherwise no other abnormalities       Assessment   1. Rotator cuff impingement syndrome of right shoulder              Plan: Recommend  trial of conservative management. Preferred HEP over formal PT. HEP was provided. He will follow up if ongoing over the next 4-6 weeks

## 2025-04-01 ENCOUNTER — OFFICE VISIT (OUTPATIENT)
Dept: PRIMARY CARE | Facility: CLINIC | Age: 60
End: 2025-04-01
Payer: COMMERCIAL

## 2025-04-01 ENCOUNTER — HOSPITAL ENCOUNTER (OUTPATIENT)
Dept: RADIOLOGY | Facility: CLINIC | Age: 60
Discharge: HOME | End: 2025-04-01
Payer: COMMERCIAL

## 2025-04-01 VITALS
HEART RATE: 69 BPM | OXYGEN SATURATION: 97 % | RESPIRATION RATE: 16 BRPM | DIASTOLIC BLOOD PRESSURE: 82 MMHG | SYSTOLIC BLOOD PRESSURE: 118 MMHG

## 2025-04-01 DIAGNOSIS — M79.642 LEFT HAND PAIN: ICD-10-CM

## 2025-04-01 DIAGNOSIS — R20.2 PARESTHESIA OF LEFT LEG: ICD-10-CM

## 2025-04-01 DIAGNOSIS — M79.642 LEFT HAND PAIN: Primary | ICD-10-CM

## 2025-04-01 PROCEDURE — 73130 X-RAY EXAM OF HAND: CPT | Mod: LEFT SIDE | Performed by: RADIOLOGY

## 2025-04-01 PROCEDURE — 99213 OFFICE O/P EST LOW 20 MIN: CPT | Performed by: INTERNAL MEDICINE

## 2025-04-01 PROCEDURE — 73130 X-RAY EXAM OF HAND: CPT | Mod: LT

## 2025-04-01 ASSESSMENT — ENCOUNTER SYMPTOMS
BACK PAIN: 1
PAIN: 1
NUMBNESS: 1
ARTHRALGIAS: 1

## 2025-04-01 ASSESSMENT — PATIENT HEALTH QUESTIONNAIRE - PHQ9
2. FEELING DOWN, DEPRESSED OR HOPELESS: NOT AT ALL
1. LITTLE INTEREST OR PLEASURE IN DOING THINGS: NOT AT ALL
SUM OF ALL RESPONSES TO PHQ9 QUESTIONS 1 AND 2: 0

## 2025-04-01 NOTE — PATIENT INSTRUCTIONS
Lets get some blood work done and rule out inflammatory arthritis or gout as hand pain  We are also doing B12 for the numbness to the thigh  Can use ibuprofen as needed for pain relief  Ice areas of discomfort 15 minutes each time   Follow up based on results

## 2025-04-01 NOTE — PROGRESS NOTES
Subjective   Patient ID: Anibal Hobson is a 59 y.o. male who presents for Pain (Pain in left hand started yesterday, numbness in left leg ongoing for months per pt).    Pain      Pt here in acute visit.  He woke yesterday and when he went to grab something, or pull up boot he will have excruciating pain of the left hand-dorsal aspect between index and middle finger.  No known injury.  Has a weird tricia on the index finger but not sure how he got.    He has chronic numbness of the left thigh.  This started months ago and only notes it when rubbing area.  He doesn't have issues otherwise.  He is not impacted by it when walking.  He has chronic low back pain.      Review of Systems   Musculoskeletal:  Positive for arthralgias and back pain.   Neurological:  Positive for numbness.       Objective   /82 (BP Location: Right arm, Patient Position: Sitting)   Pulse 69   Resp 16   SpO2 97%      Physical Exam  Musculoskeletal:         General: Signs of injury present. No swelling, tenderness or deformity. Normal range of motion.      Right lower leg: No edema.      Left lower leg: No edema.      Comments: Minor red linear line noted dorsal aspect of left index finger without drainage noted   No pain with range of motion  No pain on palpation of hand/good hand grasp without pain/issue   Full ROM of left lower extremity without pain; does note weird sensation just about left knee of the anterior thigh    Skin:     Findings: No erythema, lesion or rash.   Neurological:      Mental Status: He is oriented to person, place, and time.      Sensory: Sensory deficit present.         Assessment/Plan   Problem List Items Addressed This Visit       Left hand pain - Primary     Pt to have labs and imaging as he doesn't recall any known injury for acute pain he is having  Can use oral ibuprofen for now and ice area   Further recs based on results          Relevant Orders    Uric Acid    Sedimentation Rate    C-Reactive Protein     XR hand left 1-2 views     Other Visit Diagnoses       Paresthesia of left leg        Relevant Orders    Vitamin B12

## 2025-04-01 NOTE — ASSESSMENT & PLAN NOTE
Pt to have labs and imaging as he doesn't recall any known injury for acute pain he is having  Can use oral ibuprofen for now and ice area   Further recs based on results

## 2025-04-02 LAB
CRP SERPL-MCNC: <3 MG/L
ERYTHROCYTE [SEDIMENTATION RATE] IN BLOOD BY WESTERGREN METHOD: 2 MM/H
URATE SERPL-MCNC: 6.7 MG/DL (ref 4–8)
VIT B12 SERPL-MCNC: 295 PG/ML (ref 200–1100)

## 2025-04-03 ENCOUNTER — TELEPHONE (OUTPATIENT)
Dept: PRIMARY CARE | Facility: CLINIC | Age: 60
End: 2025-04-03
Payer: COMMERCIAL

## 2025-04-03 DIAGNOSIS — S60.552A FOREIGN BODY OF LEFT HAND, INITIAL ENCOUNTER: Primary | ICD-10-CM

## 2025-04-03 NOTE — TELEPHONE ENCOUNTER
Patient informed on results- he will take oral b12 1000mcg/ daily. He is also interested in ortho please place referral and I will inform pt when it is in.

## 2025-04-03 NOTE — TELEPHONE ENCOUNTER
----- Message from Samina Juan sent at 4/3/2025  7:51 AM EDT -----  He actually has small foreign body noted palm aspect of index finger   Recommend seeing ortho due to pain

## 2025-04-10 ENCOUNTER — APPOINTMENT (OUTPATIENT)
Dept: ORTHOPEDIC SURGERY | Facility: CLINIC | Age: 60
End: 2025-04-10
Payer: COMMERCIAL

## 2025-04-10 DIAGNOSIS — M79.642 LEFT HAND PAIN: Primary | ICD-10-CM

## 2025-04-10 PROCEDURE — 1036F TOBACCO NON-USER: CPT | Performed by: ORTHOPAEDIC SURGERY

## 2025-04-10 PROCEDURE — 99213 OFFICE O/P EST LOW 20 MIN: CPT | Performed by: ORTHOPAEDIC SURGERY

## 2025-04-10 NOTE — PROGRESS NOTES
Subjective    Patient ID: Anibal Hobson is a 59 y.o. male.    Chief Complaint: Pain of the Left Index Finger (NPV L 2ND DIGIT PAIN X 3/31/25 [facility]/PT NOTES THEY HAD SIGNIFICANT PAIN INITIALLY, BUT COME IN TODAY WITH LITTLE TO NO PAIN/SAW PCP 4/1/25 XRAYS SHOWED FOREIGN BODY OF THUMB NOT 2ND DIGIT)     Last Surgery: No surgery found  Last Surgery Date: No surgery found    HPI  The patient is a 59-year-old right-hand-dominant gentleman who comes in with a complaint of left hand pain near his index finger.  He states the pain began after he was using a wood .  He is able to finish his project but noticed the pain afterwards when he attempted to  a simple object.  He had x-rays obtained which showed no pathology near the index finger.  He states that today he has no pain.  Initially he complained of pain in the index finger near the proximal phalanx.  He complains of no pain in his left thumb.  Objective   Ortho Exam  The patient is in no acute distress.  Exam of his left hand and wrist reveals his skin envelope is intact.  He has full range of motion in all of his fingers.  He has no tenderness to palpation anywhere along the course of his left index finger.  He has appropriate function of his EDC, EIP, FDS and FDP.  He has no tenderness in his left thumb.    Image Results:  XR hand left 3+ views  Narrative: Interpreted By:  Kevin Molina,   STUDY:  XR HAND LEFT 3+ VIEWS      INDICATION:  Signs/Symptoms:attention to base of index/middle finger.      COMPARISON:  None      ACCESSION NUMBER(S):  [id number]      ORDERING CLINICIAN:  MATT BETH      FINDINGS:  Small metallic foreign body in the palmar soft tissues at the 1st  distal phalanx.      Mild osteoarthritis of the left hand.      No acute findings.      Impression:     Small metallic foreign body in the palmar soft tissues at the 1st  distal phalanx.      Mild osteoarthritis of the left hand.      No acute findings.      Signed by: Kevin Molina 4/2/2025  5:54 PM  Dictation workstation:   [id number]      Assessment/Plan   Encounter Diagnoses:  Left hand pain    The patient has left hand pain which has since resolved.  Most of his pain was near the index finger proximal phalanx which does not correlate to the finding on the x-ray.  He has no pain in his left thumb near where the x-ray states there is a potential foreign body.  At this time the patient will follow-up as his symptoms dictate.

## 2025-05-29 ASSESSMENT — PROMIS GLOBAL HEALTH SCALE
CARRYOUT_PHYSICAL_ACTIVITIES: COMPLETELY
EMOTIONAL_PROBLEMS: RARELY
RATE_PHYSICAL_HEALTH: GOOD
RATE_SOCIAL_SATISFACTION: GOOD
RATE_GENERAL_HEALTH: GOOD
CARRYOUT_SOCIAL_ACTIVITIES: GOOD
RATE_AVERAGE_PAIN: 6
RATE_MENTAL_HEALTH: GOOD
RATE_QUALITY_OF_LIFE: GOOD
RATE_AVERAGE_FATIGUE: MILD

## 2025-06-05 ENCOUNTER — APPOINTMENT (OUTPATIENT)
Dept: PRIMARY CARE | Facility: CLINIC | Age: 60
End: 2025-06-05
Payer: COMMERCIAL

## 2025-06-05 VITALS
DIASTOLIC BLOOD PRESSURE: 67 MMHG | BODY MASS INDEX: 28.24 KG/M2 | TEMPERATURE: 96.5 F | HEIGHT: 71 IN | RESPIRATION RATE: 16 BRPM | SYSTOLIC BLOOD PRESSURE: 105 MMHG | WEIGHT: 201.7 LBS | OXYGEN SATURATION: 95 % | HEART RATE: 65 BPM

## 2025-06-05 DIAGNOSIS — Z12.5 SCREENING FOR PROSTATE CANCER: ICD-10-CM

## 2025-06-05 DIAGNOSIS — I83.93 VARICOSE VEINS OF BOTH LOWER EXTREMITIES WITHOUT ULCER OR INFLAMMATION: ICD-10-CM

## 2025-06-05 DIAGNOSIS — Z12.11 SCREENING FOR COLORECTAL CANCER: ICD-10-CM

## 2025-06-05 DIAGNOSIS — Z00.00 ROUTINE GENERAL MEDICAL EXAMINATION AT A HEALTH CARE FACILITY: Primary | ICD-10-CM

## 2025-06-05 DIAGNOSIS — Z12.12 SCREENING FOR COLORECTAL CANCER: ICD-10-CM

## 2025-06-05 DIAGNOSIS — Z13.220 SCREENING FOR LIPID DISORDERS: ICD-10-CM

## 2025-06-05 DIAGNOSIS — H90.0 CONDUCTIVE HEARING LOSS, BILATERAL: ICD-10-CM

## 2025-06-05 DIAGNOSIS — Z13.29 SCREENING FOR THYROID DISORDER: ICD-10-CM

## 2025-06-05 DIAGNOSIS — Z23 NEED FOR TDAP VACCINATION: ICD-10-CM

## 2025-06-05 PROBLEM — I48.0 PAROXYSMAL ATRIAL FIBRILLATION (MULTI): Status: RESOLVED | Noted: 2024-06-03 | Resolved: 2025-06-05

## 2025-06-05 PROBLEM — M79.642 LEFT HAND PAIN: Status: RESOLVED | Noted: 2025-04-01 | Resolved: 2025-06-05

## 2025-06-05 PROBLEM — R07.81 RIB PAIN ON LEFT SIDE: Status: RESOLVED | Noted: 2024-03-22 | Resolved: 2025-06-05

## 2025-06-05 PROCEDURE — 90471 IMMUNIZATION ADMIN: CPT | Performed by: INTERNAL MEDICINE

## 2025-06-05 PROCEDURE — 99396 PREV VISIT EST AGE 40-64: CPT | Performed by: INTERNAL MEDICINE

## 2025-06-05 PROCEDURE — 3008F BODY MASS INDEX DOCD: CPT | Performed by: INTERNAL MEDICINE

## 2025-06-05 PROCEDURE — 1036F TOBACCO NON-USER: CPT | Performed by: INTERNAL MEDICINE

## 2025-06-05 PROCEDURE — 90715 TDAP VACCINE 7 YRS/> IM: CPT | Performed by: INTERNAL MEDICINE

## 2025-06-05 RX ORDER — LANOLIN ALCOHOL/MO/W.PET/CERES
1000 CREAM (GRAM) TOPICAL DAILY
COMMUNITY

## 2025-06-05 ASSESSMENT — ENCOUNTER SYMPTOMS
FREQUENCY: 0
STRIDOR: 0
SINUS PRESSURE: 0
WOUND: 0
SORE THROAT: 0
HALLUCINATIONS: 0
DIZZINESS: 0
RHINORRHEA: 0
NUMBNESS: 0
EYE PAIN: 0
TROUBLE SWALLOWING: 0
FATIGUE: 0
NECK PAIN: 0
WHEEZING: 0
APPETITE CHANGE: 0
ACTIVITY CHANGE: 0
DYSURIA: 0
DECREASED CONCENTRATION: 0
SHORTNESS OF BREATH: 0
DIFFICULTY URINATING: 0
SINUS PAIN: 0
FLANK PAIN: 0
COUGH: 0
ARTHRALGIAS: 1
DYSPHORIC MOOD: 0
HYPERACTIVE: 0
ABDOMINAL PAIN: 0
POLYDIPSIA: 0
EYE ITCHING: 0
VOMITING: 0
PALPITATIONS: 0
JOINT SWELLING: 0
SEIZURES: 0
FEVER: 0
LIGHT-HEADEDNESS: 0
MYALGIAS: 0
COLOR CHANGE: 0
ANAL BLEEDING: 0
POLYPHAGIA: 0
ADENOPATHY: 0
CONFUSION: 0
TREMORS: 0
BRUISES/BLEEDS EASILY: 0
BACK PAIN: 0
ABDOMINAL DISTENTION: 0
CHILLS: 0
HEADACHES: 0
SPEECH DIFFICULTY: 0
VOICE CHANGE: 0
NECK STIFFNESS: 0
DIAPHORESIS: 0
EYE REDNESS: 0
CHEST TIGHTNESS: 0
AGITATION: 0
DIARRHEA: 0
EYE DISCHARGE: 0
BLOOD IN STOOL: 0
RECTAL PAIN: 0
PHOTOPHOBIA: 0
NAUSEA: 0
HEMATURIA: 0
FACIAL SWELLING: 0
APNEA: 0
CHOKING: 0
SLEEP DISTURBANCE: 0
WEAKNESS: 0
FACIAL ASYMMETRY: 0
UNEXPECTED WEIGHT CHANGE: 0
NERVOUS/ANXIOUS: 0
CONSTIPATION: 0

## 2025-06-05 NOTE — ASSESSMENT & PLAN NOTE
Has hearing aids  Has heavy wax production noted left-will do irrigation today  Use debrox drops to help

## 2025-06-05 NOTE — PATIENT INSTRUCTIONS
You got your tdap booster today-good for 10 years   Complete cologuard when you receive it   Use debrox ear drops to help manage wax production/removal   Monitor urinary symptoms-we are testing prostate in blood  Get fasting blood work done today-we will call if abnormal  Continue healthy diet and work to start exercising regularly-goal of 150 minutes/week of moderate exercise  Wear compression socks as able to help manage varicosities   Follow up here in 1 year-sooner if needed

## 2025-06-05 NOTE — PROGRESS NOTES
Subjective   Patient ID: Anibal Hobson is a 59 y.o. male who presents for Annual Exam.    HPI  Pt here for full physical.  His appetite is good.  Doing yard work to stay active.  His weight is stable.      He did cologuard in 2022 and it was negative.  He has no bowel or GI issues and no family history of colorectal cancers.  He wishes to repeat cologuard.     He has some urgency with urination at times.  He is not sure if related to coffee consumption-he drinks 2-3 cups of decaf.      Review of Systems   Constitutional:  Negative for activity change, appetite change, chills, diaphoresis, fatigue, fever and unexpected weight change.   HENT:  Positive for hearing loss. Negative for congestion, dental problem, drooling, ear discharge, ear pain, facial swelling, mouth sores, nosebleeds, postnasal drip, rhinorrhea, sinus pressure, sinus pain, sneezing, sore throat, tinnitus, trouble swallowing and voice change.    Eyes:  Negative for photophobia, pain, discharge, redness, itching and visual disturbance (wears glasses-has upcoming visit).   Respiratory:  Negative for apnea, cough, choking, chest tightness, shortness of breath, wheezing and stridor.    Cardiovascular:  Negative for chest pain, palpitations and leg swelling.   Gastrointestinal:  Negative for abdominal distention, abdominal pain, anal bleeding, blood in stool, constipation, diarrhea, nausea, rectal pain and vomiting.   Endocrine: Negative for cold intolerance, heat intolerance, polydipsia, polyphagia and polyuria.   Genitourinary:  Positive for urgency. Negative for decreased urine volume, difficulty urinating, dysuria, enuresis, flank pain, frequency, genital sores, hematuria, penile discharge, penile pain, penile swelling, scrotal swelling and testicular pain.   Musculoskeletal:  Positive for arthralgias (right shoulder at times). Negative for back pain, gait problem, joint swelling, myalgias, neck pain and neck stiffness.   Skin:  Negative for color  "change, pallor, rash and wound.   Allergic/Immunologic: Negative for environmental allergies, food allergies and immunocompromised state.   Neurological:  Negative for dizziness, tremors, seizures, syncope, facial asymmetry, speech difficulty, weakness, light-headedness, numbness and headaches.   Hematological:  Negative for adenopathy. Does not bruise/bleed easily.   Psychiatric/Behavioral:  Negative for agitation, behavioral problems, confusion, decreased concentration, dysphoric mood, hallucinations, self-injury, sleep disturbance and suicidal ideas. The patient is not nervous/anxious and is not hyperactive.        Objective   /67 (BP Location: Right arm, Patient Position: Sitting)   Pulse 65   Temp 35.8 °C (96.5 °F) (Tympanic)   Resp 16   Ht 1.81 m (5' 11.25\")   Wt 91.5 kg (201 lb 11.2 oz)   SpO2 95%   BMI 27.93 kg/m²      Physical Exam  Constitutional:       Appearance: Normal appearance.   HENT:      Head: Normocephalic and atraumatic.      Right Ear: Tympanic membrane, ear canal and external ear normal. There is no impacted cerumen.      Left Ear: Tympanic membrane, ear canal and external ear normal. There is no impacted cerumen.      Nose: Nose normal. No congestion or rhinorrhea.      Mouth/Throat:      Mouth: Mucous membranes are moist.      Pharynx: Oropharynx is clear. No oropharyngeal exudate or posterior oropharyngeal erythema.   Eyes:      Extraocular Movements: Extraocular movements intact.      Conjunctiva/sclera: Conjunctivae normal.      Pupils: Pupils are equal, round, and reactive to light.   Neck:      Vascular: No carotid bruit.   Cardiovascular:      Rate and Rhythm: Normal rate and regular rhythm.      Pulses: Normal pulses.      Heart sounds: Normal heart sounds. No murmur heard.  Pulmonary:      Effort: Pulmonary effort is normal. No respiratory distress.      Breath sounds: Normal breath sounds. No wheezing, rhonchi or rales.   Abdominal:      General: Abdomen is flat. Bowel " sounds are normal. There is no distension.      Palpations: Abdomen is soft.      Tenderness: There is no abdominal tenderness.      Hernia: No hernia is present.   Musculoskeletal:         General: No swelling or tenderness. Normal range of motion.      Cervical back: Normal range of motion and neck supple.      Right lower leg: No edema.      Left lower leg: No edema.   Lymphadenopathy:      Cervical: No cervical adenopathy.   Skin:     General: Skin is warm and dry.      Findings: No lesion or rash.   Neurological:      General: No focal deficit present.      Mental Status: He is alert and oriented to person, place, and time.      Cranial Nerves: No cranial nerve deficit.      Sensory: No sensory deficit.      Motor: No weakness.   Psychiatric:         Mood and Affect: Mood normal.         Behavior: Behavior normal.         Thought Content: Thought content normal.         Judgment: Judgment normal.         Assessment/Plan   Problem List Items Addressed This Visit       Hearing loss, bilateral    Has hearing aids  Has heavy wax production noted left-will do irrigation today  Use debrox drops to help          Varicose veins of both lower extremities without ulcer or inflammation    Wears compression socks           Other Visit Diagnoses         Routine general medical examination at a health care facility    -  Primary    Relevant Orders    Comprehensive Metabolic Panel    CBC    Follow Up In Primary Care - Health Maintenance      Screening for colorectal cancer        Relevant Orders    Cologuard® colon cancer screening      Screening for thyroid disorder        Relevant Orders    TSH with reflex to Free T4 if abnormal      Screening for lipid disorders        Relevant Orders    Lipid Panel      Screening for prostate cancer        Relevant Orders    PSA      Need for Tdap vaccination        Relevant Orders    Tdap vaccine, age 7 years and older  (BOOSTRIX) (Completed)

## 2025-06-06 ENCOUNTER — TELEPHONE (OUTPATIENT)
Dept: PRIMARY CARE | Facility: CLINIC | Age: 60
End: 2025-06-06
Payer: COMMERCIAL

## 2025-06-06 DIAGNOSIS — E78.2 MIXED HYPERLIPIDEMIA: Primary | ICD-10-CM

## 2025-06-06 LAB
ALBUMIN SERPL-MCNC: 4.4 G/DL (ref 3.6–5.1)
ALP SERPL-CCNC: 88 U/L (ref 35–144)
ALT SERPL-CCNC: 19 U/L (ref 9–46)
ANION GAP SERPL CALCULATED.4IONS-SCNC: 9 MMOL/L (CALC) (ref 7–17)
AST SERPL-CCNC: 16 U/L (ref 10–35)
BILIRUB SERPL-MCNC: 1.7 MG/DL (ref 0.2–1.2)
BUN SERPL-MCNC: 22 MG/DL (ref 7–25)
CALCIUM SERPL-MCNC: 9.3 MG/DL (ref 8.6–10.3)
CHLORIDE SERPL-SCNC: 104 MMOL/L (ref 98–110)
CHOLEST SERPL-MCNC: 220 MG/DL
CHOLEST/HDLC SERPL: 3.1 (CALC)
CO2 SERPL-SCNC: 27 MMOL/L (ref 20–32)
CREAT SERPL-MCNC: 0.91 MG/DL (ref 0.7–1.3)
EGFRCR SERPLBLD CKD-EPI 2021: 98 ML/MIN/1.73M2
ERYTHROCYTE [DISTWIDTH] IN BLOOD BY AUTOMATED COUNT: 12.1 % (ref 11–15)
GLUCOSE SERPL-MCNC: 105 MG/DL (ref 65–99)
HCT VFR BLD AUTO: 47.4 % (ref 38.5–50)
HDLC SERPL-MCNC: 70 MG/DL
HGB BLD-MCNC: 15.9 G/DL (ref 13.2–17.1)
LDLC SERPL CALC-MCNC: 131 MG/DL (CALC)
MCH RBC QN AUTO: 28.4 PG (ref 27–33)
MCHC RBC AUTO-ENTMCNC: 33.5 G/DL (ref 32–36)
MCV RBC AUTO: 84.6 FL (ref 80–100)
NONHDLC SERPL-MCNC: 150 MG/DL (CALC)
PLATELET # BLD AUTO: 264 THOUSAND/UL (ref 140–400)
PMV BLD REES-ECKER: 10 FL (ref 7.5–12.5)
POTASSIUM SERPL-SCNC: 4.6 MMOL/L (ref 3.5–5.3)
PROT SERPL-MCNC: 7.3 G/DL (ref 6.1–8.1)
PSA SERPL-MCNC: 0.91 NG/ML
RBC # BLD AUTO: 5.6 MILLION/UL (ref 4.2–5.8)
SODIUM SERPL-SCNC: 140 MMOL/L (ref 135–146)
TRIGL SERPL-MCNC: 86 MG/DL
TSH SERPL-ACNC: 0.88 MIU/L (ref 0.4–4.5)
WBC # BLD AUTO: 6.2 THOUSAND/UL (ref 3.8–10.8)

## 2025-06-06 NOTE — TELEPHONE ENCOUNTER
Patient informed on results- he would be interested in doing ct cardiac score, please place order and I will inform patient when it is in.

## 2025-06-06 NOTE — TELEPHONE ENCOUNTER
Patients wife called there son has [place] syndrome, so with the elevated bilirubin, that you should be aware, and if you want more labs drawn let him know.       Please advise

## 2025-06-06 NOTE — TELEPHONE ENCOUNTER
----- Message from Samina Juan sent at 6/6/2025 10:41 AM EDT -----  Cholesterol went up a bit from last year-LDL now 131-goal is 100 or less; recommend CT cardiac score to make sure no plaque noted in major arteries  Improve diet-lean protein/fish/veggies and less fried/fatty foods, more healthy fats/oils and exercise regularly   ----- Message -----  From: [facility] Results In  Sent: 6/6/2025   8:36 AM EDT  To: Samina ROUSE DO

## 2025-06-09 NOTE — TELEPHONE ENCOUNTER
Patient informed and asked that you order the ultrasound.  I do not see an order. He will call and schedule.

## 2025-06-09 NOTE — TELEPHONE ENCOUNTER
Correction after looking at bilirubin level this time vs last and known history now of Gilbert's and all rest of liver enzymes being normal we can wait on getting ultrasound

## 2025-06-16 LAB — NONINV COLON CA DNA+OCC BLD SCRN STL QL: NEGATIVE

## 2025-07-23 ENCOUNTER — OFFICE VISIT (OUTPATIENT)
Dept: PRIMARY CARE | Facility: CLINIC | Age: 60
End: 2025-07-23
Payer: COMMERCIAL

## 2025-07-23 VITALS
DIASTOLIC BLOOD PRESSURE: 72 MMHG | BODY MASS INDEX: 28 KG/M2 | OXYGEN SATURATION: 96 % | SYSTOLIC BLOOD PRESSURE: 111 MMHG | WEIGHT: 202.2 LBS | HEART RATE: 67 BPM | RESPIRATION RATE: 18 BRPM

## 2025-07-23 DIAGNOSIS — L23.7 POISON IVY DERMATITIS: Primary | ICD-10-CM

## 2025-07-23 PROCEDURE — 99213 OFFICE O/P EST LOW 20 MIN: CPT | Performed by: INTERNAL MEDICINE

## 2025-07-23 RX ORDER — CLOBETASOL PROPIONATE 0.5 MG/G
CREAM TOPICAL 2 TIMES DAILY
Qty: 15 G | Refills: 0 | Status: SHIPPED | OUTPATIENT
Start: 2025-07-23 | End: 2026-03-20

## 2025-07-23 NOTE — PROGRESS NOTES
Subjective   Patient ID: Anibal Hobson is a 59 y.o. male who presents for Rash (Has been using benadryl cream. Was outside working on yard near poison ivy. Since Saturday. Started with tricia on antecubital, then spread into little dots across different body parts.).    Here for a rash.    4 days ago had a group of red dots appear along his L elbow. Became itchy 2 days ago. Yday noticed it spread to his chest and RUE as well. This all started after pt did yardwork over the weekend. He does get some pain when pushing or bumping along the rash.    Rash        Review of Systems   Skin:  Positive for rash.       /72 (BP Location: Right arm, Patient Position: Sitting)   Pulse 67   Resp 18   Wt 91.7 kg (202 lb 3.2 oz)   SpO2 96%   BMI 28.00 kg/m²   Objective   Physical Exam  Constitutional:       General: He is not in acute distress.     Appearance: He is not ill-appearing, toxic-appearing or diaphoretic.   HENT:      Head: Normocephalic and atraumatic.     Eyes:      Conjunctiva/sclera: Conjunctivae normal.       Skin:     Findings: Erythema (scattered papules along L antecubital area, R chest, and RUE) present.     Neurological:      Mental Status: He is alert.         Assessment/Plan   Problem List Items Addressed This Visit    None  Visit Diagnoses         Codes      Poison ivy dermatitis    -  Primary L23.7    Relevant Medications    clobetasol (Temovate) 0.05 % cream        -Presentation concerning for poison ivy. Will treat with steroid cream. Reviewed where this can or can't be used. If no improvement by Friday then need to consider prednisone. Side effects for prednisone reviewed (pt believes he has gotten it PO in the past). Pt agreeable with plan. To follow up Friday if not seeing improvement.       Jasbir Singh MD 07/23/25 8:58 AM

## 2025-08-09 ENCOUNTER — HOSPITAL ENCOUNTER (OUTPATIENT)
Dept: RADIOLOGY | Facility: CLINIC | Age: 60
Discharge: HOME | End: 2025-08-09
Payer: COMMERCIAL

## 2025-08-09 DIAGNOSIS — E78.2 MIXED HYPERLIPIDEMIA: ICD-10-CM

## 2025-08-09 PROCEDURE — 75571 CT HRT W/O DYE W/CA TEST: CPT

## 2025-08-11 ENCOUNTER — RESULTS FOLLOW-UP (OUTPATIENT)
Dept: PRIMARY CARE | Facility: CLINIC | Age: 60
End: 2025-08-11
Payer: COMMERCIAL

## 2026-06-08 ENCOUNTER — APPOINTMENT (OUTPATIENT)
Dept: PRIMARY CARE | Facility: CLINIC | Age: 61
End: 2026-06-08
Payer: COMMERCIAL